# Patient Record
Sex: FEMALE | Employment: UNEMPLOYED | URBAN - METROPOLITAN AREA
[De-identification: names, ages, dates, MRNs, and addresses within clinical notes are randomized per-mention and may not be internally consistent; named-entity substitution may affect disease eponyms.]

---

## 2024-05-15 ENCOUNTER — OFFICE VISIT (OUTPATIENT)
Dept: RHEUMATOLOGY | Facility: CLINIC | Age: 29
End: 2024-05-15
Payer: COMMERCIAL

## 2024-05-15 ENCOUNTER — LAB (OUTPATIENT)
Dept: LAB | Facility: LAB | Age: 29
End: 2024-05-15
Payer: COMMERCIAL

## 2024-05-15 ENCOUNTER — HOSPITAL ENCOUNTER (OUTPATIENT)
Dept: RADIOLOGY | Facility: CLINIC | Age: 29
Discharge: HOME | End: 2024-05-15
Payer: COMMERCIAL

## 2024-05-15 VITALS
WEIGHT: 251 LBS | DIASTOLIC BLOOD PRESSURE: 79 MMHG | HEIGHT: 67 IN | HEART RATE: 85 BPM | SYSTOLIC BLOOD PRESSURE: 122 MMHG | BODY MASS INDEX: 39.39 KG/M2

## 2024-05-15 DIAGNOSIS — M24.9 HYPERMOBILITY OF JOINT: ICD-10-CM

## 2024-05-15 DIAGNOSIS — M12.9 ARTHROPATHY: ICD-10-CM

## 2024-05-15 DIAGNOSIS — M12.9 ARTHROPATHY: Primary | ICD-10-CM

## 2024-05-15 DIAGNOSIS — R76.8 ANA POSITIVE: ICD-10-CM

## 2024-05-15 PROBLEM — F39 MOOD DISORDER (CMS-HCC): Status: ACTIVE | Noted: 2022-01-20

## 2024-05-15 LAB
25(OH)D3 SERPL-MCNC: 29 NG/ML (ref 30–100)
CCP IGG SERPL-ACNC: <1 U/ML
CENTROMERE B AB SER-ACNC: >8 AI
CHROMATIN AB SERPL-ACNC: <0.2 AI
CRP SERPL-MCNC: 0.93 MG/DL
DSDNA AB SER-ACNC: <1 IU/ML
ENA JO1 AB SER QL IA: <0.2 AI
ENA RNP AB SER IA-ACNC: 1 AI
ENA SCL70 AB SER QL IA: <0.2 AI
ENA SM AB SER IA-ACNC: <0.2 AI
ENA SM+RNP AB SER QL IA: <0.2 AI
ENA SS-A AB SER IA-ACNC: <0.2 AI
ENA SS-B AB SER IA-ACNC: <0.2 AI
ERYTHROCYTE [SEDIMENTATION RATE] IN BLOOD BY WESTERGREN METHOD: 12 MM/H (ref 0–20)
RHEUMATOID FACT SER NEPH-ACNC: <10 IU/ML (ref 0–15)
RIBOSOMAL P AB SER-ACNC: <0.2 AI
THYROPEROXIDASE AB SERPL-ACNC: 52 IU/ML
URATE SERPL-MCNC: 4.4 MG/DL (ref 2.3–6.7)

## 2024-05-15 PROCEDURE — 86431 RHEUMATOID FACTOR QUANT: CPT

## 2024-05-15 PROCEDURE — 86235 NUCLEAR ANTIGEN ANTIBODY: CPT

## 2024-05-15 PROCEDURE — 73130 X-RAY EXAM OF HAND: CPT | Mod: 50

## 2024-05-15 PROCEDURE — 82306 VITAMIN D 25 HYDROXY: CPT

## 2024-05-15 PROCEDURE — 99204 OFFICE O/P NEW MOD 45 MIN: CPT | Performed by: INTERNAL MEDICINE

## 2024-05-15 PROCEDURE — 36415 COLL VENOUS BLD VENIPUNCTURE: CPT

## 2024-05-15 PROCEDURE — 86200 CCP ANTIBODY: CPT

## 2024-05-15 PROCEDURE — 86038 ANTINUCLEAR ANTIBODIES: CPT

## 2024-05-15 PROCEDURE — 84550 ASSAY OF BLOOD/URIC ACID: CPT

## 2024-05-15 PROCEDURE — 86800 THYROGLOBULIN ANTIBODY: CPT

## 2024-05-15 PROCEDURE — 86225 DNA ANTIBODY NATIVE: CPT

## 2024-05-15 PROCEDURE — 86376 MICROSOMAL ANTIBODY EACH: CPT

## 2024-05-15 PROCEDURE — 73130 X-RAY EXAM OF HAND: CPT | Mod: BILATERAL PROCEDURE | Performed by: RADIOLOGY

## 2024-05-15 PROCEDURE — 86140 C-REACTIVE PROTEIN: CPT

## 2024-05-15 PROCEDURE — 85652 RBC SED RATE AUTOMATED: CPT

## 2024-05-15 PROCEDURE — 1036F TOBACCO NON-USER: CPT | Performed by: INTERNAL MEDICINE

## 2024-05-15 RX ORDER — LEVONORGESTREL 52 MG/1
INTRAUTERINE DEVICE INTRAUTERINE
COMMUNITY
Start: 2022-09-28

## 2024-05-15 RX ORDER — ERGOCALCIFEROL (VITAMIN D2) 200 MCG/ML
DROPS ORAL
COMMUNITY

## 2024-05-15 RX ORDER — METOPROLOL SUCCINATE 25 MG/1
12.5 TABLET, EXTENDED RELEASE ORAL DAILY
COMMUNITY

## 2024-05-15 RX ORDER — IPRATROPIUM BROMIDE 42 UG/1
SPRAY, METERED NASAL
COMMUNITY
Start: 2024-01-07

## 2024-05-15 RX ORDER — LORATADINE 10 MG/1
10 TABLET ORAL DAILY
COMMUNITY

## 2024-05-15 NOTE — PROGRESS NOTES
Initial Rheumatology Patient Visit    Chief Complaint:  Margaret Rivas is a 28 y.o. female presenting today for New Patient Visit.    History of Presenting Problem:   27 y/o female with Hx of positive JAMAL present with various concerns. She report she had had various issues including joint pain/muscle pain and Hx of Hives in the past. She report symptoms started in . She report her symptoms includes myalgia, joint pain in her fingers, wrist, shoulder/upper back and lower back/Hips.  Some times she report her musculoskeletal symptoms prevent her from engaging in activity with scaring her her daughter. Her face will appear flush occasional.  She has tried to take ibuprofen 400 mg help 50%. Naprosen 500 mg helped 75%    Problem List:   Patient Active Problem List   Diagnosis    Tachycardia    Mood disorder (CMS-[facility])       Past Medical History: History reviewed. No pertinent past medical history.    Surgical History:   Past Surgical History:   Procedure Laterality Date     SECTION, CLASSIC              Allergies: No Known Allergies    Medications:   Current Outpatient Medications:     ergocalciferol (Vitamin D-2) 200 mcg/mL (8,000 unit/mL) drops, Take by mouth., Disp: , Rfl:     ipratropium (Atrovent) 42 mcg (0.06 %) nasal spray, INSTILL 2 SPRAYS INTRANASALLY FOUR TIMES DAILY, Disp: , Rfl:     levonorgestreL (Liletta) 20.4 mcg/24 hrs (8 yrs) 52 mg intrauterine device, Take 1 device by intrauterine route., Disp: , Rfl:     loratadine (Claritin) 10 mg tablet, Take 1 tablet (10 mg) by mouth once daily., Disp: , Rfl:     metoprolol succinate XL (Toprol-XL) 25 mg 24 hr tablet, Take 0.5 tablets (12.5 mg) by mouth once daily., Disp: , Rfl:     Review of Systems:   ROS: She report  Morning stiffness of 10 mins ,she report occasional finger joint pain and swelling, Denies dry eyes and mouth, Denies  oral, nasal or genital ulcers, Denies sun sensitivity, but get red spot on her skin in the sun , She report Hx of  "Raynaud's phenomenon since 2021. Denies Uveitis or recent vision changes. Denies new rashes or Hx of Psoriasis, Denies alopecia, She report hair thinning    Denies Chest pain/SOB, cough, Hx of asthma, Denies Fever/chills/sweats, Denies Fatigue, Denies weight loss  Denies abdominal pain, New onset Dyspepsia, dysphasia, She report  nausea with standing or changing position, she is being evaluated for JAY./ Denies vomiting, she report alternative constipation/diarrhea , dark/tarry stools, blood in stools or urine.   Denies Hx of blood clot or miscarriages. She report Hx of easy bruising with trauma or bleeding. Denies Headaches, numbness and tingling, weakness.  All other systems have been reviewed and are negative for complaint.   Father and Grandfather with Gout.  Aunt and grandmother with Lupus    Objective   Physical Examination:    Visit Vitals  /79   Pulse 85   Ht 1.71 m (5' 7.32\")   Wt 114 kg (251 lb)   BMI 38.94 kg/m²   Smoking Status Never   BSA 2.33 m²        Gen: NAD, A&O x 3  HEENT: clear sclera and conjunctiva,     Musculoskeletal:   Neck; WNL, full ROM  Shoulder: WNL, full ROM  Elbow:WNL, full ROM, no effusion noted  Wrist and fingers;no active synovitis noted, Full ROM in the Wrist , Good fist and   Knees:  No effusions or crepitation, full ROM.  Hips; WNL, full ROM, Negative Osbaldo test  Ankle, Feet; WNL, full ROM    Skin: No rashes or lesions seen, no nail changes  Neuro: A&O x3, Normal Gait    Procedures :None    Orders:  Orders Placed This Encounter   Procedures    XR hand 3+ views bilateral    JAMAL + NGOC Panel    Rheumatoid Factor    Anti-Thyroglobulin Antibody    Thyroid Peroxidase (TPO) Antibody    Citrulline Antibody, IgG    Uric Acid    Sedimentation Rate    C-Reactive Protein    Vitamin D 25-Hydroxy,Total (for eval of Vitamin D levels)        Provider Impression:   Assessment/Plan   Encounter Diagnoses   Name Primary?    Arthropathy Yes    Hypermobility of joint     JAMAL positive     "       27 y/o female with Hx of positive JAMAL present with various concerns. She report she had had various issues including joint pain/muscle pain and Hx of Hives in the past. On exam patient is hypermobile this can be associated with POTS and chronic musculoskeletal pain.  Rule out underlying connective tissue disorder.   -Obtain additional serologies  -Obtain screening x-rays  -Follow-up will be based on her testing

## 2024-05-17 LAB — THYROGLOB AB SERPL-ACNC: <0.9 IU/ML (ref 0–4)

## 2024-05-20 LAB
ANA PATTERN: ABNORMAL
ANA SER QL HEP2 SUBST: POSITIVE
ANA TITR SER IF: ABNORMAL {TITER}

## 2024-05-23 ENCOUNTER — OFFICE VISIT (OUTPATIENT)
Dept: RHEUMATOLOGY | Facility: CLINIC | Age: 29
End: 2024-05-23
Payer: COMMERCIAL

## 2024-05-23 VITALS
HEIGHT: 67 IN | DIASTOLIC BLOOD PRESSURE: 83 MMHG | SYSTOLIC BLOOD PRESSURE: 128 MMHG | WEIGHT: 253.3 LBS | HEART RATE: 108 BPM | BODY MASS INDEX: 39.75 KG/M2

## 2024-05-23 DIAGNOSIS — M24.9 HYPERMOBILITY OF JOINT: ICD-10-CM

## 2024-05-23 DIAGNOSIS — M35.9 UNDIFFERENTIATED CONNECTIVE TISSUE DISEASE (MULTI): Primary | ICD-10-CM

## 2024-05-23 PROCEDURE — 1036F TOBACCO NON-USER: CPT | Performed by: INTERNAL MEDICINE

## 2024-05-23 PROCEDURE — 99214 OFFICE O/P EST MOD 30 MIN: CPT | Performed by: INTERNAL MEDICINE

## 2024-05-23 RX ORDER — HYDROXYCHLOROQUINE SULFATE 200 MG/1
200 TABLET, FILM COATED ORAL DAILY
Qty: 90 TABLET | Refills: 1 | Status: SHIPPED | OUTPATIENT
Start: 2024-05-23 | End: 2025-05-23

## 2024-05-23 RX ORDER — MELOXICAM 15 MG/1
15 TABLET ORAL DAILY
Qty: 30 TABLET | Refills: 1 | Status: SHIPPED | OUTPATIENT
Start: 2024-05-23 | End: 2024-06-22

## 2024-05-23 NOTE — PROGRESS NOTES
Initial Rheumatology Patient Visit    Chief Complaint:  Margaret Rivas is a 28 y.o. female presenting today for Follow-up.    History of Presenting Problem:   27 y/o female with Hx of positive JAMAL present with various concerns. She report she had had various issues including joint pain/muscle pain and Hx of Hives in the past. She report symptoms started in . She report her symptoms includes myalgia, joint pain in her fingers, wrist, shoulder/upper back and lower back/Hips.  Some times she report her musculoskeletal symptoms prevent her from engaging in activity with caring for her daughter. Her face will appear flush occasional.  She has tried to take ibuprofen 400 mg help 50%. Naprosen 500 mg helped 75%.  Patient is here to review the results    Problem List:   Patient Active Problem List   Diagnosis    Tachycardia    Mood disorder (CMS-[facility])       Past Medical History: No past medical history on file.    Surgical History:   Past Surgical History:   Procedure Laterality Date     SECTION, CLASSIC              Allergies: No Known Allergies    Medications:   Current Outpatient Medications:     ergocalciferol (Vitamin D-2) 200 mcg/mL (8,000 unit/mL) drops, Take by mouth., Disp: , Rfl:     ipratropium (Atrovent) 42 mcg (0.06 %) nasal spray, INSTILL 2 SPRAYS INTRANASALLY FOUR TIMES DAILY, Disp: , Rfl:     levonorgestreL (Liletta) 20.4 mcg/24 hrs (8 yrs) 52 mg intrauterine device, Take 1 device by intrauterine route., Disp: , Rfl:     loratadine (Claritin) 10 mg tablet, Take 1 tablet (10 mg) by mouth once daily., Disp: , Rfl:     metoprolol succinate XL (Toprol-XL) 25 mg 24 hr tablet, Take 0.5 tablets (12.5 mg) by mouth once daily., Disp: , Rfl:     hydroxychloroquine (Plaquenil) 200 mg tablet, Take 1 tablet (200 mg) by mouth once daily., Disp: 90 tablet, Rfl: 1    meloxicam (Mobic) 15 mg tablet, Take 1 tablet (15 mg) by mouth once daily., Disp: 30 tablet, Rfl: 1        Objective   Physical  "Examination:    Visit Vitals  /83   Pulse 108   Ht 1.71 m (5' 7.32\")   Wt 115 kg (253 lb 4.8 oz)   BMI 39.29 kg/m²   Smoking Status Never   BSA 2.34 m²        Gen: NAD, A&O x 3  HEENT: clear sclera and conjunctiva,     Musculoskeletal:   Neck; WNL, full ROM  Shoulder: WNL, full ROM  Elbow:WNL, full ROM, no effusion noted  Wrist and fingers;no active synovitis noted, Full ROM in the Wrist , Good fist and   Knees:  No effusions or crepitation, full ROM.  Hips; WNL, full ROM, Negative Osbaldo test  Ankle, Feet; WNL, full ROM    Skin: No rashes or lesions seen, no nail changes  Neuro: A&O x3, Normal Gait    Procedures :None    Orders:  No orders of the defined types were placed in this encounter.       Provider Impression:   Assessment/Plan   Encounter Diagnoses   Name Primary?    Undifferentiated connective tissue disease (Multi) Yes    Hypermobility of joint        27 y/o female with Hx of positive JAMAL present with various concerns. She report she had had various issues including joint pain/muscle pain and Hx of Hives in the past. On exam patient is hypermobile this can be associated with POTS and chronic musculoskeletal pain.  Patient's testing showed a positive JAMAL of 1-6 40 with a positive anticentromere antibody and RNP.  At this time she does not have any criteria to meet lupus or scleroderma, patient may have an undifferentiated connective tissue disease at this time.  -After discussion with patient we decided to give a trial of hydroxychloroquine 200 mg daily  -Start meloxicam 15 mg daily  -Engaging in exercise programs or pain management may help with relieving chronic musculoskeletal pain  -Follow-up in 8 weeks    "

## 2024-07-23 ENCOUNTER — APPOINTMENT (OUTPATIENT)
Dept: RHEUMATOLOGY | Facility: CLINIC | Age: 29
End: 2024-07-23
Payer: COMMERCIAL